# Patient Record
(demographics unavailable — no encounter records)

---

## 2019-12-14 NOTE — NUR
SEEN RESTING IN BED AAOX4. DENIES PAIN. BREATHING E/U ON ROOM AIR. TELE# 12
INPL[address], SR WITH OCCATIONAL PVC'S. STATED ABLE TO AMBULATE TO BATHROOM WITHOUT
DIFFICULTY. VOIDS, URINAL AT BEDSIDE. IVF 1/2NS AT 75ML/HR TO LFA INFUSING
WELL. CALL LIGHT PLACED WITHIN EASY REACH. SIDERAILS UP X2.

## 2019-12-14 NOTE — NUR
PT RESTING COMFORTABLY IN BED. NO ACUTE DISTRESS NOTED. EMPTIED 400ML YELLOW
URINE FROM URINAL. NO C/O PAIN OR SOB. POC DISCUSSED WITH PT. ALL CONCERNS AND
QUESTIONS ANSWERED. BED IN LOWEST POSITION. SIDE RAILS UPX2. CALL LIGHT WITHIN
REACH. WILL CONTINUE TO MONITOR.

## 2019-12-14 NOTE — NUR
PT RESTING IN BED. DENIES PAIN/DISCOMFORT AT THIS TIME. BED IN LOWEST
POSITION. CALL LIGHT WITHIN REACH. WILL CONTINUE TO MONITOR.

## 2019-12-14 NOTE — NUR
CALLED TO DR. HEART, MADE AWARE OF WBC IS 19.1 THIS AM, WILL PENDING ID.
/OR DR. FATIMA TO SEE PATIENT PER DR. HEART.

## 2019-12-14 NOTE — NUR
PT TRANSFERRED TO BED 250B [place] [place] ON CARDIAC MONITOR WITH BRIDGET HOANG AND EMT
AUBRIE AT PT SIDE.

## 2019-12-14 NOTE — NUR
CARE ASSUMED FROM OUTGOING RN. PT RESTING COMFORTABLY IN BED. NO ACUTE
DISTRESS NOTED. EVEN AND UNLABORED RESPIRATIONS ON RA. ON TELE# 12 READING SR
73 WITH PVC'S, NO PACER SPIKES NOTED. IV PATENT AND INTACT RUNNING FLUIDS PER
EMAR. NO C/O PAIN AT THIS TIME. BED IN LOWEST POSITION. SIDE RAILS UPX2. CALL
LIGHT WITHIN REACH. WILL CONTINUE TO MONITOR.

## 2019-12-14 NOTE — NUR
PATIENT MADE AWARE OF CURRENT CONDITION. ALL QUESTIONS ANSWERED AT THIS TIME.
PATIENT VERBALIZED UNDERSTANDING.

## 2019-12-14 NOTE — NUR
AFEBRILE THROUGHOUT SHIFT. DENIES PAIN. STATED VOIDS FREELY. WALKED ON THE
HALLWAY X1 ACCOMPANIED BY HIS WIFE. IVF 1/2NS INFUSING WELL AT 75ML/HR.

## 2019-12-14 NOTE — NUR
RECEIVED PT FROM ED. PT AOX4, DENIES HA/DIZZINESS. PLACED ON TELE #12, READING
SR WITH PVCS. DENIES CP/PRESSURE. DENIES SOB/DIFFICULTY BREATHING, ON RA. IV
TO LFA, INTACT AND PATENT. BED IN LOWEST POSITION. CALL LIGHT WITHIN REACH.
WILL CONTINUE TO MONITOR.

## 2019-12-15 NOTE — NUR
LATE ENTRY 1830: PATIENT SIGNED DISCHARGE PAPER WORK, IV DC'D FROM LEFT
FOREARM WITH CATH INTACT, SITE DRESSED WITH GAUZE AND COBAN, TELE RETURNED TO
MT STATION, PATIENT VERBALIZED HE WOULD CALL A CAB FROM THE LOBBY, TAKEN DOWN
BY CNA.

## 2019-12-15 NOTE — NUR
REPORT TAKEN FROM NIGHT SHIFT NURSE AT THE BEDSIDE, PATIENT AWAKE AND ALERT,
NO NEEDS AT THIS TIME WILL CONTINUE TO MONITOR.

## 2019-12-15 NOTE — NUR
PT SLEPT IN INTERVALS THROUGHOUT THE SHIFT. ALL NEEDS TENDED TO AND MET. ALL
SCHEDULED MEDICATIONS GIVEN. C/O INSOMNIA MEDICATED PER EMAR. ON TELE# 12
READING SR WITH OCC PVC'S, PACED ON DEMAND. NO C/O PAIN. BED IN LOWEST
POSITION. SIDE RAILS UPX2. CALL LIGHT WITHIN REACH. WILL ENDORSE TO ONCOMING
SHIFT.